# Patient Record
Sex: FEMALE | Race: WHITE
[De-identification: names, ages, dates, MRNs, and addresses within clinical notes are randomized per-mention and may not be internally consistent; named-entity substitution may affect disease eponyms.]

---

## 2019-03-07 NOTE — HP
HISTORY OF PRESENT ILLNESS:  Ms. Solange Esquivel is a very pleasant 64-year-
old

female comes in for a  colonoscopy because of chronic diarrhea.  The patient 
has been having diarrhea

over the last several months.  Stools are soft to watery.  She has had stool 
studies

negative, which came back negative for any pathology.  She also empirically 
treated

with some antibiotics.  The patient has this diarrhea with some lower abdominal

cramping off and on.  There is no history of any weight loss.  No history of any

dysuria.  The patient had colonoscopy I believe 5 years ago.  This exam was

negative.  There is no family history of colon cancer.  The patient is 
undergoing

colonoscopy because of chronic diarrhea. 



ALLERGIES:  NONE.



SOCIAL HISTORY:  The patient does not smoke or drink alcohol socially.



MEDICAL ILLNESSES:  

1. Depression.

2. Hyperlipidemia.

3. Tubal ligation.

4. D and C.



PHYSICAL EXAMINATION:

GENERAL:  She is thin built, appears comfortable. 

VITAL SIGNS:  Pulse is 72, blood pressure 110/76. 

CARDIOVASCULAR:  First and second heart sounds. 

LUNGS:  Clear to auscultation. 

ABDOMEN:  Soft.  No organomegaly.  No tenderness.  No masses.



ADMITTING DIAGNOSIS:  Chronic diarrhea.



PLAN:  Colonoscopy.







Job ID:  [id number]



MTDD

## 2019-03-11 NOTE — OP
DATE OF PROCEDURE:  



ADDENDUM:  Please add in the dictation that she had random biopsies obtained from

the ascending colon, transverse colon to rule out microscopic colitis. 







Job ID:  [id number]

## 2019-12-31 NOTE — RAD
LUMBAR SPINE SERIES THREE VIEWS:

 

HISTORY:

Back pain.

 

FINDINGS:

Vertebral bodies are normal in height. There is moderate disk narrowing at L2-L3. There is severe dis
k narrowing at L4-L5. At the L5-S1 level there is left sided pseudoarthrosis of the left L5 transvers
e process with the sacrum associated with some minimal scoliotic change, convex to the left. Vascular
 calcifications are noted.

 

IMPRESSION:

Moderate arthritic changes of the spine.

 

POS: [facility]

## 2022-01-26 ENCOUNTER — HOSPITAL ENCOUNTER (OUTPATIENT)
Dept: HOSPITAL 92 - BICMAMMO | Age: 67
Discharge: HOME | End: 2022-01-26
Attending: FAMILY MEDICINE
Payer: MEDICARE

## 2022-01-26 DIAGNOSIS — Z78.0: ICD-10-CM

## 2022-01-26 DIAGNOSIS — Z13.820: ICD-10-CM

## 2022-01-26 DIAGNOSIS — M85.89: ICD-10-CM

## 2022-01-26 DIAGNOSIS — Z12.31: Primary | ICD-10-CM

## 2022-01-26 PROCEDURE — 77080 DXA BONE DENSITY AXIAL: CPT

## 2022-01-26 PROCEDURE — 77067 SCR MAMMO BI INCL CAD: CPT

## 2022-01-26 PROCEDURE — 77063 BREAST TOMOSYNTHESIS BI: CPT
